# Patient Record
Sex: FEMALE | URBAN - METROPOLITAN AREA
[De-identification: names, ages, dates, MRNs, and addresses within clinical notes are randomized per-mention and may not be internally consistent; named-entity substitution may affect disease eponyms.]

---

## 2021-03-11 ENCOUNTER — TRANSFERRED RECORDS (OUTPATIENT)
Dept: HEALTH INFORMATION MANAGEMENT | Facility: CLINIC | Age: 21
End: 2021-03-11

## 2021-05-26 ENCOUNTER — TRANSFERRED RECORDS (OUTPATIENT)
Dept: HEALTH INFORMATION MANAGEMENT | Facility: CLINIC | Age: 21
End: 2021-05-26

## 2021-10-11 ENCOUNTER — TELEPHONE (OUTPATIENT)
Dept: NEUROLOGY | Facility: CLINIC | Age: 21
End: 2021-10-11

## 2021-10-11 NOTE — LETTER
2022       RE: Ramona Murillo  : 2000   MRN: [id number]      Dear Colleague,    Thank you for your referral to the [facility]. Unfortunately, the patient declined services and we were unable to schedule the referral.      If you have any questions or concerns, please contact our office at Dept: [phone].        Sincerely,  [facility]

## 2022-03-03 NOTE — TELEPHONE ENCOUNTER
Called patient.  She does not need an evaluation of her seizures at this time.  Will send communication to Dr. Jean-Baptiste.